# Patient Record
Sex: MALE | Race: WHITE | Employment: FULL TIME | URBAN - METROPOLITAN AREA
[De-identification: names, ages, dates, MRNs, and addresses within clinical notes are randomized per-mention and may not be internally consistent; named-entity substitution may affect disease eponyms.]

---

## 2019-11-20 ENCOUNTER — OFFICE VISIT (OUTPATIENT)
Dept: FAMILY MEDICINE | Facility: CLINIC | Age: 34
End: 2019-11-20

## 2019-11-20 VITALS
SYSTOLIC BLOOD PRESSURE: 122 MMHG | TEMPERATURE: 98.6 F | WEIGHT: 236 LBS | DIASTOLIC BLOOD PRESSURE: 82 MMHG | HEART RATE: 76 BPM | OXYGEN SATURATION: 98 %

## 2019-11-20 DIAGNOSIS — S61.210A LACERATION OF RIGHT INDEX FINGER WITHOUT FOREIGN BODY WITHOUT DAMAGE TO NAIL, INITIAL ENCOUNTER: Primary | ICD-10-CM

## 2019-11-20 PROCEDURE — 99203 OFFICE O/P NEW LOW 30 MIN: CPT | Performed by: NURSE PRACTITIONER

## 2019-11-20 NOTE — PATIENT INSTRUCTIONS
Keep dressing on for 24 hours  Cleanse daily with soap and water  Tylenol and ibuprofen to help with pain  Can do ice to help with pain or swelling    Watch for signs of infection any increased redness, swelling, discharge, or fevers.

## 2019-11-20 NOTE — PROGRESS NOTES
SUBJECTIVE:     Chief Complaint   Patient presents with     Laceration     Right Pointer finger cut last night around 2am     Maurice Malcolm is a 34 year old male who presents to the clinic with a laceration on the right finger sustained 13 hour(s) ago.  This is a non-work related and accidental injury.    Mechanism of injury: was closing his razor and cut his right index finger.    Associated symptoms: Denies numbness, weakness, or loss of function  Last tetanus booster within 10 years: yes    EXAM:   The patient appears today in alert,no apparent distress distress  VITALS: /82   Pulse 76   Temp 98.6  F (37  C) (Oral)   Wt 107 kg (236 lb)   SpO2 98%     Size of laceration: 1 centimeters  Characteristics of the laceration: flap type and jagged  Tendon function intact: yes  Sensation to light touch intact: yes  Pulses intact: yes    Assessment:  Laceration of right index finger without foreign body without damage to nail, initial encounter      After care instructions:  Wound was cleansed and dressed - Keep wound clean and dry for the next 24-48 hours  Tylenol and ibuprofen to help with pain  Ice to help with pain and swelling  Signs of infection discussed today  Education was added to AVS. Patient was agreeable to plan and verbalized understanding.